# Patient Record
Sex: FEMALE | Race: WHITE | Employment: FULL TIME | URBAN - METROPOLITAN AREA
[De-identification: names, ages, dates, MRNs, and addresses within clinical notes are randomized per-mention and may not be internally consistent; named-entity substitution may affect disease eponyms.]

---

## 2017-09-12 ENCOUNTER — HOSPITAL ENCOUNTER (OUTPATIENT)
Dept: ULTRASOUND IMAGING | Age: 52
Discharge: HOME | End: 2017-09-12
Attending: OTOLARYNGOLOGY
Payer: COMMERCIAL

## 2017-09-12 ENCOUNTER — LAB ENCOUNTER (OUTPATIENT)
Dept: LAB | Age: 52
End: 2017-09-12
Attending: OTOLARYNGOLOGY
Payer: COMMERCIAL

## 2017-09-12 DIAGNOSIS — E07.9 THYROID DYSFUNCTION: ICD-10-CM

## 2017-09-12 LAB
FREE T4: 1 NG/DL (ref 0.9–1.8)
T3FREE SERPL-MCNC: 2.52 PG/ML (ref 2.3–4.2)
TSI SER-ACNC: 1.11 MIU/ML (ref 0.35–5.5)

## 2017-09-12 PROCEDURE — 84439 ASSAY OF FREE THYROXINE: CPT

## 2017-09-12 PROCEDURE — 76536 US EXAM OF HEAD AND NECK: CPT | Performed by: OTOLARYNGOLOGY

## 2017-09-12 PROCEDURE — 84481 FREE ASSAY (FT-3): CPT

## 2017-09-12 PROCEDURE — 36415 COLL VENOUS BLD VENIPUNCTURE: CPT

## 2017-09-12 PROCEDURE — 84443 ASSAY THYROID STIM HORMONE: CPT

## 2017-09-16 ENCOUNTER — HOSPITAL ENCOUNTER (OUTPATIENT)
Dept: GENERAL RADIOLOGY | Facility: HOSPITAL | Age: 52
Discharge: HOME | End: 2017-09-16
Attending: OTOLARYNGOLOGY
Payer: COMMERCIAL

## 2017-09-16 DIAGNOSIS — K22.6: ICD-10-CM

## 2017-09-16 DIAGNOSIS — R13.19 ESOPHAGEAL DYSPHAGIA: ICD-10-CM

## 2017-09-16 PROCEDURE — 92611 MOTION FLUOROSCOPY/SWALLOW: CPT

## 2017-09-16 PROCEDURE — 74230 X-RAY XM SWLNG FUNCJ C+: CPT | Performed by: OTOLARYNGOLOGY

## 2017-09-16 NOTE — PROGRESS NOTES
ADULT VIDEOFLUOROSCOPIC SWALLOWING STUDY    Admission Date: 9/16/2017  Evaluation Date: 09/16/17  Radiologist: Dr. Herminio Harkins    Dear Dr. Feng Weinberg,  This letter is to inform you of Renata Richmond Videofluoroscopic Swallowing Study results and/or plan of mary Score 1: Material does not enter airway         Overall Impression: Pt demonstrates overall WFL oropharyngeal swallow. Pt demonstrated no penetration or aspiration within the scope of this evaluation.  Pt demonstrated a timely swallow response with thin liq

## 2018-01-16 ENCOUNTER — LAB ENCOUNTER (OUTPATIENT)
Dept: LAB | Age: 53
End: 2018-01-16
Attending: NURSE PRACTITIONER
Payer: COMMERCIAL

## 2018-01-16 DIAGNOSIS — Z51.81 MEDICATION MONITORING ENCOUNTER: ICD-10-CM

## 2018-01-16 LAB
25-HYDROXYVITAMIN D (TOTAL): 23.1 NG/ML (ref 30–100)
HAV IGM SER QL: 2.3 MG/DL (ref 1.7–3)

## 2018-01-16 PROCEDURE — 83735 ASSAY OF MAGNESIUM: CPT

## 2018-01-16 PROCEDURE — 82306 VITAMIN D 25 HYDROXY: CPT

## 2018-01-16 PROCEDURE — 36415 COLL VENOUS BLD VENIPUNCTURE: CPT

## 2018-01-18 PROBLEM — E55.9 VITAMIN D DEFICIENCY: Status: ACTIVE | Noted: 2018-01-18

## 2018-01-18 NOTE — PROGRESS NOTES
Neha Butler,  Your vitamin D level is low. You will need to take vitamin D 50,000 IU weekly for 2 months (this prescription has been emailed to your pharmacy)  and then recheck your vitamin D level (an order has been placed at the lab).

## 2018-01-19 NOTE — PROGRESS NOTES
Results have been released to [facility] with MD comments  Pt has already viewed  Rx & lab ordered by MD

## 2018-03-28 ENCOUNTER — LAB ENCOUNTER (OUTPATIENT)
Dept: LAB | Age: 53
End: 2018-03-28
Attending: INTERNAL MEDICINE
Payer: COMMERCIAL

## 2018-03-28 DIAGNOSIS — E55.9 VITAMIN D DEFICIENCY: ICD-10-CM

## 2018-03-28 DIAGNOSIS — E04.1 THYROID NODULE: ICD-10-CM

## 2018-03-28 DIAGNOSIS — E07.9 THYROID DYSFUNCTION: ICD-10-CM

## 2018-03-28 PROCEDURE — 84439 ASSAY OF FREE THYROXINE: CPT

## 2018-03-28 PROCEDURE — 82306 VITAMIN D 25 HYDROXY: CPT

## 2018-03-28 PROCEDURE — 86800 THYROGLOBULIN ANTIBODY: CPT

## 2018-03-28 PROCEDURE — 84481 FREE ASSAY (FT-3): CPT

## 2018-03-28 PROCEDURE — 86376 MICROSOMAL ANTIBODY EACH: CPT

## 2018-03-28 PROCEDURE — 36415 COLL VENOUS BLD VENIPUNCTURE: CPT

## 2018-03-28 PROCEDURE — 84443 ASSAY THYROID STIM HORMONE: CPT

## 2018-03-29 LAB
25-HYDROXYVITAMIN D (TOTAL): 67.9 NG/ML (ref 30–100)
ANTI-THYROGLOBULIN: 27 U/ML (ref ?–60)
ANTI-THYROPEROXIDASE: <28 U/ML (ref ?–60)
FREE T4: 1 NG/DL (ref 0.9–1.8)
T3FREE SERPL-MCNC: 2.59 PG/ML (ref 2.3–4.2)
TSI SER-ACNC: 0.6 MIU/ML (ref 0.35–5.5)

## 2018-04-04 PROBLEM — R05.9 COUGH: Status: ACTIVE | Noted: 2018-04-04

## 2018-05-15 PROBLEM — Z82.49 FH: HEART DISEASE: Status: ACTIVE | Noted: 2018-05-15

## 2018-05-15 PROBLEM — R05.9 COUGH: Status: RESOLVED | Noted: 2018-04-04 | Resolved: 2018-05-15

## 2018-05-27 ENCOUNTER — CHARTING TRANS (OUTPATIENT)
Dept: OTHER | Age: 53
End: 2018-05-27

## 2018-10-10 ENCOUNTER — HOSPITAL ENCOUNTER (OUTPATIENT)
Dept: ULTRASOUND IMAGING | Age: 53
Discharge: HOME | End: 2018-10-10
Attending: OTOLARYNGOLOGY
Payer: COMMERCIAL

## 2018-10-10 ENCOUNTER — APPOINTMENT (OUTPATIENT)
Dept: LAB | Age: 53
End: 2018-10-10
Attending: OTOLARYNGOLOGY
Payer: COMMERCIAL

## 2018-10-10 DIAGNOSIS — E07.9 THYROID DISORDER: ICD-10-CM

## 2018-10-10 DIAGNOSIS — E07.9 THYROID DYSFUNCTION: ICD-10-CM

## 2018-10-10 PROCEDURE — 84439 ASSAY OF FREE THYROXINE: CPT

## 2018-10-10 PROCEDURE — 76536 US EXAM OF HEAD AND NECK: CPT | Performed by: OTOLARYNGOLOGY

## 2018-10-10 PROCEDURE — 84443 ASSAY THYROID STIM HORMONE: CPT

## 2018-10-10 PROCEDURE — 36415 COLL VENOUS BLD VENIPUNCTURE: CPT

## 2018-11-01 VITALS
HEART RATE: 72 BPM | TEMPERATURE: 99.3 F | WEIGHT: 147.99 LBS | OXYGEN SATURATION: 96 % | RESPIRATION RATE: 16 BRPM | HEIGHT: 66 IN | BODY MASS INDEX: 23.78 KG/M2

## 2019-01-07 ENCOUNTER — HOSPITAL ENCOUNTER (EMERGENCY)
Facility: HOSPITAL | Age: 54
Discharge: HOME | End: 2019-01-07
Attending: PEDIATRICS
Payer: COMMERCIAL

## 2019-01-07 VITALS
RESPIRATION RATE: 18 BRPM | BODY MASS INDEX: 22.82 KG/M2 | HEART RATE: 79 BPM | DIASTOLIC BLOOD PRESSURE: 55 MMHG | SYSTOLIC BLOOD PRESSURE: 114 MMHG | TEMPERATURE: 98 F | HEIGHT: 66 IN | OXYGEN SATURATION: 99 % | WEIGHT: 142 LBS
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
## 2019-01-07 DIAGNOSIS — S61.451A DOG BITE, HAND, RIGHT, INITIAL ENCOUNTER: Primary | ICD-10-CM

## 2019-01-07 PROCEDURE — 99283 EMERGENCY DEPT VISIT LOW MDM: CPT

## 2019-01-07 RX ORDER — AMOXICILLIN AND CLAVULANATE POTASSIUM 875; 125 MG/1; MG/1
1 TABLET, FILM COATED ORAL ONCE
Status: COMPLETED | OUTPATIENT
Start: 2019-01-07 | End: 2019-01-07

## 2019-01-07 RX ORDER — AMOXICILLIN AND CLAVULANATE POTASSIUM 875; 125 MG/1; MG/1
1 TABLET, FILM COATED ORAL 2 TIMES DAILY
Qty: 10 TABLET | Refills: 0 | Status: SHIPPED | OUTPATIENT
Start: 2019-01-07 | End: 2019-01-12

## 2019-01-08 NOTE — ED PROVIDER NOTES
Patient Seen in: [facility] Emergency Department    History   Patient presents with:  Bite (integumentary)    Stated Complaint: dog bite right hand[facility]    [facility]PI    44-year-old female here with dog bite to right hand a few hours ago. Head: Normocephalic and atraumatic. Nose: Nose normal.   Mouth/Throat: Oropharynx is clear and moist.   Eyes: EOM are normal. Pupils are equal, round, and reactive to light. Neck: Normal range of motion. Neck supple.    Cardiovascular: Normal heart so Instructed to return to emergency department or contact PCP for persistent, recurrent, or worsening symptoms.               Disposition and Plan     Clinical Impression:  Dog bite, hand, right, initial encounter  (primary encounter diagnosis)    Disposition

## 2019-03-28 ENCOUNTER — LAB ENCOUNTER (OUTPATIENT)
Dept: LAB | Age: 54
End: 2019-03-28
Attending: OTOLARYNGOLOGY
Payer: COMMERCIAL

## 2019-03-28 DIAGNOSIS — E07.9 THYROID DYSFUNCTION: ICD-10-CM

## 2019-03-28 DIAGNOSIS — E04.1 THYROID NODULE: ICD-10-CM

## 2019-03-28 PROCEDURE — 84443 ASSAY THYROID STIM HORMONE: CPT

## 2019-03-28 PROCEDURE — 36415 COLL VENOUS BLD VENIPUNCTURE: CPT

## 2019-03-28 PROCEDURE — 84439 ASSAY OF FREE THYROXINE: CPT

## 2019-10-03 PROCEDURE — 84630 ASSAY OF ZINC: CPT | Performed by: OTHER

## 2019-10-03 PROCEDURE — 36415 COLL VENOUS BLD VENIPUNCTURE: CPT | Performed by: OTHER

## 2019-10-03 PROCEDURE — 83883 ASSAY NEPHELOMETRY NOT SPEC: CPT | Performed by: OTHER

## 2019-10-03 PROCEDURE — 84165 PROTEIN E-PHORESIS SERUM: CPT | Performed by: OTHER

## 2019-10-03 PROCEDURE — 86334 IMMUNOFIX E-PHORESIS SERUM: CPT | Performed by: OTHER

## 2019-10-03 PROCEDURE — 84207 ASSAY OF VITAMIN B-6: CPT | Performed by: OTHER

## 2019-10-09 PROCEDURE — 82525 ASSAY OF COPPER: CPT | Performed by: OTHER

## 2019-10-09 PROCEDURE — 36415 COLL VENOUS BLD VENIPUNCTURE: CPT | Performed by: OTHER

## 2020-08-12 ENCOUNTER — OFFICE VISIT (OUTPATIENT)
Dept: SURGERY | Facility: CLINIC | Age: 55
End: 2020-08-12
Payer: COMMERCIAL

## 2020-08-12 VITALS
DIASTOLIC BLOOD PRESSURE: 64 MMHG | HEIGHT: 66 IN | SYSTOLIC BLOOD PRESSURE: 110 MMHG | HEART RATE: 76 BPM | WEIGHT: 145 LBS | BODY MASS INDEX: 23.3 KG/M2

## 2020-08-12 DIAGNOSIS — R20.2 BILATERAL NUMBNESS AND TINGLING OF ARMS AND LEGS: ICD-10-CM

## 2020-08-12 DIAGNOSIS — M47.816 LUMBAR SPONDYLOSIS: Primary | ICD-10-CM

## 2020-08-12 DIAGNOSIS — R20.0 BILATERAL NUMBNESS AND TINGLING OF ARMS AND LEGS: ICD-10-CM

## 2020-08-12 DIAGNOSIS — M48.02 CERVICAL STENOSIS OF SPINAL CANAL: ICD-10-CM

## 2020-08-12 DIAGNOSIS — G89.29 CHRONIC MIDLINE LOW BACK PAIN WITHOUT SCIATICA: ICD-10-CM

## 2020-08-12 DIAGNOSIS — M54.50 CHRONIC MIDLINE LOW BACK PAIN WITHOUT SCIATICA: ICD-10-CM

## 2020-08-12 PROCEDURE — 99204 OFFICE O/P NEW MOD 45 MIN: CPT | Performed by: PHYSICIAN ASSISTANT

## 2020-08-12 PROCEDURE — 3008F BODY MASS INDEX DOCD: CPT | Performed by: PHYSICIAN ASSISTANT

## 2020-08-12 PROCEDURE — 3074F SYST BP LT 130 MM HG: CPT | Performed by: PHYSICIAN ASSISTANT

## 2020-08-12 PROCEDURE — 3078F DIAST BP <80 MM HG: CPT | Performed by: PHYSICIAN ASSISTANT

## 2020-08-12 NOTE — PROGRESS NOTES
Patient: Johnny Varela  Medical Record Number: [id number]    PCP: Ricardo Ramírez MD    HISTORY OF CHIEF COMPLAINT:    Johnny Varela is a 50year old female, who complains of 2 years h/o worsening bilateral hand numbness.  She notes that numbness initial 2009    left salivary duct calcified stone removed   • SINUS SURGERY       • UPPER GI ENDOSCOPY,EXAM        Family History   Problem Relation Age of Onset   • Heart Disorder Father         62 mi   • Cancer Father         kidney ca 64   • Heart Attack Fathe clear.  C2-C3: No central canal stenosis or neural foraminal narrowing is noted. C3-C4: No central canal stenosis or neural foraminal narrowing is noted.   C4-C5: Posterior disc osteophyte complex is noted with bilateral uncovertebral hypertrophy resulting normal limits. L2-L3: Mild posterior facet arthropathy but otherwise within normal limits. L3-L4: Very mild disc bulge. At least mild hypertrophy of the ligamentum flavum and posterior facets  from degenerative changes.  Very mild inferior foraminal narro paraspinals Neg Neg   Thoracic paraspinals Neg Neg   Lumbar paraspinals Neg Neg   SIJ Neg Neg   PSIS Neg Neg   Trochanteric Bursa Neg Neg     Strength:      DTR's:    Left  Right      Left  Right  Deltoid   5/5  5/5    Biceps   2+/4  2+/4  Biceps   5/5  5/ and will consider her options. In addition, we discussed that she does have one degenerative disc in her lumbar spine, but it does not appear to be causing any significant nerve compression.  I do think this is the cause for her back pain, but not her l

## 2020-08-12 NOTE — PROGRESS NOTES
Location of Pain:  Pt states that she has been having issues with cervical pain with radaiintg pain in the left shoulder into the left arm.  Pt states that she has been having issues with numbness and tingling and issues with weakness in the left hand and a

## 2020-10-05 PROBLEM — G62.9 SENSORY NEUROPATHY: Status: ACTIVE | Noted: 2020-10-05

## 2020-10-05 PROBLEM — H81.12 BENIGN PAROXYSMAL POSITIONAL VERTIGO OF LEFT EAR: Status: ACTIVE | Noted: 2020-10-05

## 2020-10-05 PROBLEM — M50.90 CERVICAL DISC DISEASE: Status: ACTIVE | Noted: 2020-10-05

## 2020-10-05 PROBLEM — M51.36 LUMBAR DEGENERATIVE DISC DISEASE: Status: ACTIVE | Noted: 2020-10-05

## 2020-10-05 PROBLEM — M51.369 LUMBAR DEGENERATIVE DISC DISEASE: Status: ACTIVE | Noted: 2020-10-05

## 2020-11-17 ENCOUNTER — HOSPITAL ENCOUNTER (OUTPATIENT)
Dept: ULTRASOUND IMAGING | Age: 55
Discharge: HOME | End: 2020-11-17
Attending: OTOLARYNGOLOGY
Payer: COMMERCIAL

## 2020-11-17 DIAGNOSIS — E07.9 THYROID DISORDER: ICD-10-CM

## 2020-11-17 PROCEDURE — 76536 US EXAM OF HEAD AND NECK: CPT | Performed by: OTOLARYNGOLOGY

## 2020-11-18 NOTE — PROGRESS NOTES
[facility], your thyroid ultrasound is overall stable.  Please keep your scheduled follow up to further discuss the results

## 2021-01-05 PROBLEM — G89.29 CHRONIC MIDLINE LOW BACK PAIN, UNSPECIFIED WHETHER SCIATICA PRESENT: Status: ACTIVE | Noted: 2020-08-12

## 2021-01-05 PROBLEM — M62.89 MUSCLE TIGHTNESS: Status: ACTIVE | Noted: 2021-01-05

## 2021-01-05 PROBLEM — R20.2 PARESTHESIA: Status: ACTIVE | Noted: 2020-08-12

## 2021-01-05 PROBLEM — R53.82 CHRONIC FATIGUE: Status: ACTIVE | Noted: 2021-01-05

## 2021-01-05 PROBLEM — L29.9 ITCHY SKIN: Status: ACTIVE | Noted: 2021-01-05

## 2021-01-05 PROBLEM — M54.50 CHRONIC MIDLINE LOW BACK PAIN, UNSPECIFIED WHETHER SCIATICA PRESENT: Status: ACTIVE | Noted: 2020-08-12

## 2021-11-28 ENCOUNTER — LAB ENCOUNTER (OUTPATIENT)
Dept: LAB | Facility: HOSPITAL | Age: 56
End: 2021-11-28
Attending: INTERNAL MEDICINE
Payer: COMMERCIAL

## 2021-11-28 DIAGNOSIS — Z01.818 PRE-OP TESTING: ICD-10-CM

## 2021-12-01 PROBLEM — K21.9 GERD (GASTROESOPHAGEAL REFLUX DISEASE): Status: ACTIVE | Noted: 2021-12-01

## 2021-12-01 PROBLEM — K44.9 HIATAL HERNIA: Status: ACTIVE | Noted: 2021-12-01

## 2021-12-01 PROBLEM — K29.60 NONEROSIVE NONSPECIFIC GASTRITIS: Status: ACTIVE | Noted: 2021-12-01

## 2022-02-17 PROBLEM — R35.1 NOCTURIA: Status: ACTIVE | Noted: 2022-02-17

## 2022-09-07 PROBLEM — Z86.010 HISTORY OF ADENOMATOUS POLYP OF COLON: Status: ACTIVE | Noted: 2022-09-07

## 2022-09-07 PROBLEM — Z86.0101 HISTORY OF ADENOMATOUS POLYP OF COLON: Status: ACTIVE | Noted: 2022-09-07

## 2022-09-07 PROBLEM — K64.8 INTERNAL HEMORRHOIDS: Status: ACTIVE | Noted: 2022-09-07

## 2022-10-27 PROBLEM — Q99.9 GENETIC DISEASE: Status: ACTIVE | Noted: 2022-09-22

## 2022-10-27 PROBLEM — Q99.9: Status: ACTIVE | Noted: 2022-09-22

## 2023-02-21 ENCOUNTER — LAB ENCOUNTER (OUTPATIENT)
Dept: LAB | Age: 58
End: 2023-02-21
Payer: COMMERCIAL

## 2023-02-21 DIAGNOSIS — R11.0 NAUSEA: ICD-10-CM

## 2023-02-21 DIAGNOSIS — M54.89 MIDLINE BACK PAIN, UNSPECIFIED BACK LOCATION, UNSPECIFIED CHRONICITY: ICD-10-CM

## 2023-02-21 LAB
ALBUMIN SERPL-MCNC: 3.7 G/DL (ref 3.4–5)
ALBUMIN/GLOB SERPL: 1.2 {RATIO} (ref 1–2)
ALP LIVER SERPL-CCNC: 44 U/L
ALT SERPL-CCNC: 20 U/L
AMYLASE SERPL-CCNC: 64 U/L (ref 25–115)
ANION GAP SERPL CALC-SCNC: 1 MMOL/L (ref 0–18)
AST SERPL-CCNC: 15 U/L (ref 15–37)
BASOPHILS # BLD AUTO: 0.04 X10(3) UL (ref 0–0.2)
BASOPHILS NFR BLD AUTO: 0.4 %
BILIRUB SERPL-MCNC: 0.3 MG/DL (ref 0.1–2)
BUN BLD-MCNC: 13 MG/DL (ref 7–18)
CALCIUM BLD-MCNC: 8.6 MG/DL (ref 8.5–10.1)
CHLORIDE SERPL-SCNC: 106 MMOL/L (ref 98–112)
CO2 SERPL-SCNC: 29 MMOL/L (ref 21–32)
CREAT BLD-MCNC: 0.7 MG/DL
EOSINOPHIL # BLD AUTO: 0.16 X10(3) UL (ref 0–0.7)
EOSINOPHIL NFR BLD AUTO: 1.7 %
ERYTHROCYTE [DISTWIDTH] IN BLOOD BY AUTOMATED COUNT: 12.2 %
FASTING STATUS PATIENT QL REPORTED: YES
GFR SERPLBLD BASED ON 1.73 SQ M-ARVRAT: 102 ML/MIN/1.73M2 (ref 60–?)
GLOBULIN PLAS-MCNC: 3.1 G/DL (ref 2.8–4.4)
GLUCOSE BLD-MCNC: 87 MG/DL (ref 70–99)
HCT VFR BLD AUTO: 37.4 %
HGB BLD-MCNC: 12.6 G/DL
IMM GRANULOCYTES # BLD AUTO: 0.02 X10(3) UL (ref 0–1)
IMM GRANULOCYTES NFR BLD: 0.2 %
LIPASE SERPL-CCNC: 176 U/L (ref 73–393)
LIPASE SERPL-CCNC: 45 U/L (ref 13–75)
LYMPHOCYTES # BLD AUTO: 3.34 X10(3) UL (ref 1–4)
LYMPHOCYTES NFR BLD AUTO: 36.3 %
MCH RBC QN AUTO: 30 PG (ref 26–34)
MCHC RBC AUTO-ENTMCNC: 33.7 G/DL (ref 31–37)
MCV RBC AUTO: 89 FL
MONOCYTES # BLD AUTO: 0.81 X10(3) UL (ref 0.1–1)
MONOCYTES NFR BLD AUTO: 8.8 %
NEUTROPHILS # BLD AUTO: 4.84 X10 (3) UL (ref 1.5–7.7)
NEUTROPHILS # BLD AUTO: 4.84 X10(3) UL (ref 1.5–7.7)
NEUTROPHILS NFR BLD AUTO: 52.6 %
OSMOLALITY SERPL CALC.SUM OF ELEC: 281 MOSM/KG (ref 275–295)
PLATELET # BLD AUTO: 295 10(3)UL (ref 150–450)
POTASSIUM SERPL-SCNC: 3.8 MMOL/L (ref 3.5–5.1)
PROT SERPL-MCNC: 6.8 G/DL (ref 6.4–8.2)
RBC # BLD AUTO: 4.2 X10(6)UL
SODIUM SERPL-SCNC: 136 MMOL/L (ref 136–145)
WBC # BLD AUTO: 9.2 X10(3) UL (ref 4–11)

## 2023-02-21 PROCEDURE — 36415 COLL VENOUS BLD VENIPUNCTURE: CPT

## 2023-02-21 PROCEDURE — 80053 COMPREHEN METABOLIC PANEL: CPT

## 2023-02-21 PROCEDURE — 85025 COMPLETE CBC W/AUTO DIFF WBC: CPT

## 2023-02-21 PROCEDURE — 82150 ASSAY OF AMYLASE: CPT

## 2023-02-21 PROCEDURE — 83690 ASSAY OF LIPASE: CPT

## 2023-09-12 PROBLEM — M79.672 PAIN IN BOTH FEET: Status: ACTIVE | Noted: 2023-03-20

## 2023-09-12 PROBLEM — M79.671 PAIN IN BOTH FEET: Status: ACTIVE | Noted: 2023-03-20

## 2023-10-30 ENCOUNTER — HOSPITAL ENCOUNTER (OUTPATIENT)
Dept: CT IMAGING | Facility: HOSPITAL | Age: 58
Discharge: HOME | End: 2023-10-30
Attending: INTERNAL MEDICINE
Payer: COMMERCIAL

## 2023-10-30 DIAGNOSIS — G89.29 OTHER CHRONIC BACK PAIN: ICD-10-CM

## 2023-10-30 DIAGNOSIS — R10.13 EPIGASTRIC PAIN: ICD-10-CM

## 2023-10-30 DIAGNOSIS — M54.9 OTHER CHRONIC BACK PAIN: ICD-10-CM

## 2023-10-30 LAB
CREAT BLD-MCNC: 0.7 MG/DL
EGFRCR SERPLBLD CKD-EPI 2021: 101 ML/MIN/1.73M2 (ref 60–?)

## 2023-10-30 PROCEDURE — 74177 CT ABD & PELVIS W/CONTRAST: CPT | Performed by: INTERNAL MEDICINE

## 2023-10-30 PROCEDURE — 82565 ASSAY OF CREATININE: CPT

## 2025-03-03 ENCOUNTER — IMAGING SERVICES (OUTPATIENT)
Dept: GENERAL RADIOLOGY | Age: 60
End: 2025-03-03

## 2025-03-03 ENCOUNTER — WORKER'S COMP (OUTPATIENT)
Dept: OCCUPATIONAL MEDICINE | Age: 60
End: 2025-03-03

## 2025-03-03 VITALS
DIASTOLIC BLOOD PRESSURE: 58 MMHG | HEART RATE: 86 BPM | HEIGHT: 66 IN | SYSTOLIC BLOOD PRESSURE: 116 MMHG | TEMPERATURE: 97.1 F | WEIGHT: 145 LBS | BODY MASS INDEX: 23.3 KG/M2
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
## 2025-03-03 DIAGNOSIS — M25.541 PAIN IN THUMB JOINT WITH MOVEMENT, RIGHT: Primary | ICD-10-CM

## 2025-03-03 DIAGNOSIS — M25.541 PAIN IN THUMB JOINT WITH MOVEMENT, RIGHT: ICD-10-CM

## 2025-03-03 PROBLEM — S63.621A SPRAIN OF INTERPHALANGEAL JOINT OF RIGHT THUMB: Status: ACTIVE | Noted: 2025-03-03

## 2025-03-03 PROCEDURE — 73140 X-RAY EXAM OF FINGER(S): CPT | Performed by: RADIOLOGY

## 2025-03-03 PROCEDURE — 99204 OFFICE O/P NEW MOD 45 MIN: CPT

## 2025-03-03 RX ORDER — IBUPROFEN 200 MG
400 TABLET ORAL ONCE
Status: COMPLETED | OUTPATIENT
Start: 2025-03-03 | End: 2025-03-03

## 2025-03-03 RX ADMIN — Medication 400 MG: at 11:17

## 2025-03-03 ASSESSMENT — ENCOUNTER SYMPTOMS
EYES NEGATIVE: 1
ALLERGIC/IMMUNOLOGIC NEGATIVE: 1
ENDOCRINE NEGATIVE: 1
CONSTITUTIONAL NEGATIVE: 1
PSYCHIATRIC NEGATIVE: 1
RESPIRATORY NEGATIVE: 1
HEMATOLOGIC/LYMPHATIC NEGATIVE: 1
GASTROINTESTINAL NEGATIVE: 1
NEUROLOGICAL NEGATIVE: 1

## 2025-03-03 ASSESSMENT — PAIN SCALES - GENERAL: PAINLEVEL: 8

## 2025-03-10 ENCOUNTER — APPOINTMENT (OUTPATIENT)
Dept: OCCUPATIONAL MEDICINE | Age: 60
End: 2025-03-10

## 2025-03-10 VITALS — SYSTOLIC BLOOD PRESSURE: 112 MMHG | HEART RATE: 86 BPM | TEMPERATURE: 98.6 F | DIASTOLIC BLOOD PRESSURE: 74 MMHG

## 2025-03-10 DIAGNOSIS — M25.541 PAIN IN THUMB JOINT WITH MOVEMENT, RIGHT: Primary | ICD-10-CM
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
## 2025-03-10 PROCEDURE — 99213 OFFICE O/P EST LOW 20 MIN: CPT

## 2025-03-10 ASSESSMENT — PAIN SCALES - GENERAL: PAINLEVEL: 2

## 2025-03-12 PROBLEM — R05.1 ACUTE COUGH: Status: ACTIVE | Noted: 2025-03-12

## 2025-03-17 ENCOUNTER — WORKER'S COMP (OUTPATIENT)
Dept: OCCUPATIONAL MEDICINE | Age: 60
End: 2025-03-17

## 2025-03-17 VITALS — SYSTOLIC BLOOD PRESSURE: 119 MMHG | DIASTOLIC BLOOD PRESSURE: 76 MMHG | HEART RATE: 93 BPM

## 2025-03-17 DIAGNOSIS — M25.541 PAIN IN THUMB JOINT WITH MOVEMENT, RIGHT: Primary | ICD-10-CM
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
## 2025-03-17 RX ORDER — AZELASTINE HCL 205.5 UG/1
SPRAY NASAL
COMMUNITY

## 2025-03-17 RX ORDER — AMITRIPTYLINE HYDROCHLORIDE 10 MG/1
TABLET ORAL
COMMUNITY
Start: 2025-03-10

## 2025-03-17 ASSESSMENT — PAIN SCALES - GENERAL: PAINLEVEL: 2

## 2025-03-24 ENCOUNTER — APPOINTMENT (OUTPATIENT)
Dept: OCCUPATIONAL MEDICINE | Age: 60
End: 2025-03-24

## 2025-03-24 VITALS — SYSTOLIC BLOOD PRESSURE: 125 MMHG | DIASTOLIC BLOOD PRESSURE: 80 MMHG | HEART RATE: 87 BPM

## 2025-03-24 DIAGNOSIS — M25.541 PAIN IN THUMB JOINT WITH MOVEMENT, RIGHT: Primary | ICD-10-CM
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
## 2025-03-24 PROCEDURE — 99213 OFFICE O/P EST LOW 20 MIN: CPT

## 2025-03-24 ASSESSMENT — PAIN SCALES - GENERAL: PAINLEVEL: 2

## 2025-03-25 ENCOUNTER — CASE MANAGEMENT (OUTPATIENT)
Dept: OCCUPATIONAL MEDICINE | Age: 60
End: 2025-03-25

## 2025-04-07 ENCOUNTER — APPOINTMENT (OUTPATIENT)
Dept: OCCUPATIONAL MEDICINE | Age: 60
End: 2025-04-07

## 2025-04-07 VITALS
HEIGHT: 66 IN | HEART RATE: 73 BPM | DIASTOLIC BLOOD PRESSURE: 76 MMHG | TEMPERATURE: 98.3 F | BODY MASS INDEX: 23.3 KG/M2 | WEIGHT: 145 LBS | SYSTOLIC BLOOD PRESSURE: 118 MMHG
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
## 2025-04-07 DIAGNOSIS — M25.541 PAIN IN THUMB JOINT WITH MOVEMENT, RIGHT: Primary | ICD-10-CM

## 2025-04-07 PROCEDURE — 99213 OFFICE O/P EST LOW 20 MIN: CPT

## 2025-04-11 ENCOUNTER — WORKER'S COMP (OUTPATIENT)
Dept: PHYSICAL THERAPY | Age: 60
End: 2025-04-11

## 2025-04-11 DIAGNOSIS — M79.644 PAIN OF RIGHT THUMB: Primary | ICD-10-CM

## 2025-04-11 DIAGNOSIS — M25.541 PAIN IN THUMB JOINT WITH MOVEMENT, RIGHT: ICD-10-CM

## 2025-04-11 ASSESSMENT — ENCOUNTER SYMPTOMS
ALLEVIATING FACTORS: REST
PAIN FREQUENCY: INTERMITTENT
SUBJECTIVE PAIN PROGRESSION: IMPROVED
PAIN SCALE AT HIGHEST: 5
ALLEVIATING FACTORS: ICE
PAIN SEVERITY NOW: 0
QUALITY: SHARP

## 2025-04-15 ENCOUNTER — APPOINTMENT (OUTPATIENT)
Dept: PHYSICAL THERAPY | Age: 60
End: 2025-04-15

## 2025-04-15 DIAGNOSIS — M25.541 PAIN IN THUMB JOINT WITH MOVEMENT, RIGHT: ICD-10-CM

## 2025-04-15 DIAGNOSIS — M79.644 PAIN OF RIGHT THUMB: Primary | ICD-10-CM

## 2025-04-15 PROCEDURE — 97530 THERAPEUTIC ACTIVITIES: CPT | Performed by: OCCUPATIONAL THERAPIST

## 2025-04-15 PROCEDURE — 97110 THERAPEUTIC EXERCISES: CPT | Performed by: OCCUPATIONAL THERAPIST

## 2025-04-15 PROCEDURE — 97112 NEUROMUSCULAR REEDUCATION: CPT | Performed by: OCCUPATIONAL THERAPIST

## 2025-04-15 ASSESSMENT — ENCOUNTER SYMPTOMS
PAIN SEVERITY NOW: 0
QUALITY: TIGHT
QUALITY: STIFF
PAIN SCALE AT HIGHEST: 10
QUALITY: SORE
ALLEVIATING FACTORS: REST

## 2025-04-21 ENCOUNTER — APPOINTMENT (OUTPATIENT)
Dept: OCCUPATIONAL MEDICINE | Age: 60
End: 2025-04-21

## 2025-04-21 VITALS
TEMPERATURE: 97.6 F | SYSTOLIC BLOOD PRESSURE: 112 MMHG | DIASTOLIC BLOOD PRESSURE: 71 MMHG | HEIGHT: 66 IN | WEIGHT: 145 LBS | BODY MASS INDEX: 23.3 KG/M2

## 2025-04-21 DIAGNOSIS — M25.541 PAIN IN THUMB JOINT WITH MOVEMENT, RIGHT: Primary | ICD-10-CM
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
## 2025-04-21 PROCEDURE — 99213 OFFICE O/P EST LOW 20 MIN: CPT

## 2025-04-21 ASSESSMENT — PAIN SCALES - GENERAL: PAINLEVEL_OUTOF10: 0

## 2025-04-22 ENCOUNTER — APPOINTMENT (OUTPATIENT)
Dept: PHYSICAL THERAPY | Age: 60
End: 2025-04-22

## 2025-04-22 DIAGNOSIS — M79.644 PAIN OF RIGHT THUMB: Primary | ICD-10-CM

## 2025-04-22 DIAGNOSIS — M25.541 PAIN IN THUMB JOINT WITH MOVEMENT, RIGHT: ICD-10-CM

## 2025-04-22 PROCEDURE — 97530 THERAPEUTIC ACTIVITIES: CPT | Performed by: OCCUPATIONAL THERAPIST

## 2025-04-22 PROCEDURE — 97110 THERAPEUTIC EXERCISES: CPT | Performed by: OCCUPATIONAL THERAPIST

## 2025-04-22 PROCEDURE — 97112 NEUROMUSCULAR REEDUCATION: CPT | Performed by: OCCUPATIONAL THERAPIST

## 2025-04-22 ASSESSMENT — ENCOUNTER SYMPTOMS
PAIN SEVERITY NOW: 0
PAIN LOCATION: RIGHT THUMB
PAIN SCALE AT HIGHEST: 4

## 2025-04-29 ENCOUNTER — APPOINTMENT (OUTPATIENT)
Dept: PHYSICAL THERAPY | Age: 60
End: 2025-04-29

## 2025-04-29 DIAGNOSIS — M25.541 PAIN IN THUMB JOINT WITH MOVEMENT, RIGHT: ICD-10-CM

## 2025-04-29 DIAGNOSIS — M79.644 PAIN OF RIGHT THUMB: Primary | ICD-10-CM

## 2025-04-29 PROCEDURE — 97530 THERAPEUTIC ACTIVITIES: CPT | Performed by: OCCUPATIONAL THERAPIST

## 2025-04-29 PROCEDURE — 97110 THERAPEUTIC EXERCISES: CPT | Performed by: OCCUPATIONAL THERAPIST

## 2025-04-29 PROCEDURE — 97112 NEUROMUSCULAR REEDUCATION: CPT | Performed by: OCCUPATIONAL THERAPIST

## 2025-04-29 ASSESSMENT — ENCOUNTER SYMPTOMS: PAIN SEVERITY NOW: 0

## 2025-05-05 ENCOUNTER — APPOINTMENT (OUTPATIENT)
Dept: OCCUPATIONAL MEDICINE | Age: 60
End: 2025-05-05

## 2025-05-05 VITALS — HEART RATE: 93 BPM | DIASTOLIC BLOOD PRESSURE: 51 MMHG | TEMPERATURE: 98.2 F | SYSTOLIC BLOOD PRESSURE: 99 MMHG

## 2025-05-05 DIAGNOSIS — M25.541 PAIN IN THUMB JOINT WITH MOVEMENT, RIGHT: Primary | ICD-10-CM
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
## 2025-05-05 PROCEDURE — 99213 OFFICE O/P EST LOW 20 MIN: CPT

## 2025-05-05 ASSESSMENT — PAIN SCALES - GENERAL: PAINLEVEL_OUTOF10: 3

## 2025-05-06 PROBLEM — S63.621A SPRAIN OF INTERPHALANGEAL JOINT OF RIGHT THUMB: Status: ACTIVE | Noted: 2025-03-03

## 2025-05-06 PROBLEM — M62.838 MUSCLE SPASM: Status: ACTIVE | Noted: 2025-03-10

## 2025-05-06 PROBLEM — M25.541 PAIN IN THUMB JOINT WITH MOVEMENT, RIGHT: Status: ACTIVE | Noted: 2025-03-03
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
## 2025-05-06 PROBLEM — M79.644 PAIN OF RIGHT THUMB: Status: ACTIVE | Noted: 2025-04-11

## 2025-05-08 ENCOUNTER — APPOINTMENT (OUTPATIENT)
Dept: PHYSICAL THERAPY | Age: 60
End: 2025-05-08

## 2025-05-08 DIAGNOSIS — M25.541 PAIN IN THUMB JOINT WITH MOVEMENT, RIGHT: ICD-10-CM

## 2025-05-08 DIAGNOSIS — M79.644 PAIN OF RIGHT THUMB: Primary | ICD-10-CM

## 2025-05-08 ASSESSMENT — ENCOUNTER SYMPTOMS: PAIN: 1

## 2025-05-12 ENCOUNTER — APPOINTMENT (OUTPATIENT)
Dept: OCCUPATIONAL MEDICINE | Age: 60
End: 2025-05-12

## 2025-05-12 VITALS
SYSTOLIC BLOOD PRESSURE: 98 MMHG | BODY MASS INDEX: 23.78 KG/M2 | HEIGHT: 66 IN | DIASTOLIC BLOOD PRESSURE: 63 MMHG | WEIGHT: 148 LBS | TEMPERATURE: 99.3 F | HEART RATE: 92 BPM

## 2025-05-12 DIAGNOSIS — M25.541 PAIN IN THUMB JOINT WITH MOVEMENT, RIGHT: Primary | ICD-10-CM
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
## 2025-05-12 PROCEDURE — 99213 OFFICE O/P EST LOW 20 MIN: CPT

## 2025-05-12 ASSESSMENT — PAIN SCALES - GENERAL: PAINLEVEL_OUTOF10: 2

## 2025-05-26 PROBLEM — R68.81 EARLY SATIETY: Status: ACTIVE | Noted: 2025-05-26

## 2025-05-26 PROBLEM — R11.10 REGURGITATION OF FOOD: Status: ACTIVE | Noted: 2025-05-26

## 2025-07-15 ENCOUNTER — HOSPITAL ENCOUNTER (OUTPATIENT)
Dept: NUCLEAR MEDICINE | Facility: HOSPITAL | Age: 60
Discharge: HOME | End: 2025-07-15
Attending: INTERNAL MEDICINE
Payer: COMMERCIAL

## 2025-07-15 DIAGNOSIS — R11.10 REGURGITATION OF FOOD: ICD-10-CM

## 2025-07-15 DIAGNOSIS — R68.81 EARLY SATIETY: ICD-10-CM

## 2025-07-15 PROCEDURE — 78264 GASTRIC EMPTYING IMG STUDY: CPT | Performed by: INTERNAL MEDICINE

## (undated) NOTE — ED AVS SNAPSHOT
Ananya Pearson   MRN: [id number]    Department:  [facility] Emergency Department   Date of Visit:  1/7/2019           Disclosure     Insurance plans vary and the physician(s) referred by the ER may not be covered by your plan.  Please contact your tell this physician (or your personal doctor if your instructions are to return to your personal doctor) about any new or lasting problems. The primary care or specialist physician will see patients referred from the [facility] Emergency Department.  Carolina Shepard